# Patient Record
Sex: MALE | Race: WHITE | HISPANIC OR LATINO | Employment: FULL TIME | URBAN - METROPOLITAN AREA
[De-identification: names, ages, dates, MRNs, and addresses within clinical notes are randomized per-mention and may not be internally consistent; named-entity substitution may affect disease eponyms.]

---

## 2018-02-27 ENCOUNTER — HOSPITAL ENCOUNTER (OUTPATIENT)
Facility: MEDICAL CENTER | Age: 38
End: 2018-02-27
Attending: PREVENTIVE MEDICINE
Payer: COMMERCIAL

## 2018-02-27 ENCOUNTER — NON-PROVIDER VISIT (OUTPATIENT)
Dept: OCCUPATIONAL MEDICINE | Facility: CLINIC | Age: 38
End: 2018-02-27

## 2018-02-27 ENCOUNTER — OFFICE VISIT (OUTPATIENT)
Dept: OCCUPATIONAL MEDICINE | Facility: CLINIC | Age: 38
End: 2018-02-27

## 2018-02-27 DIAGNOSIS — Z02.89 ENCOUNTER FOR OCCUPATIONAL HEALTH EXAMINATION: ICD-10-CM

## 2018-02-27 PROCEDURE — 92553 AUDIOMETRY AIR & BONE: CPT | Performed by: PREVENTIVE MEDICINE

## 2018-02-27 PROCEDURE — 94375 RESPIRATORY FLOW VOLUME LOOP: CPT | Performed by: PREVENTIVE MEDICINE

## 2018-02-27 PROCEDURE — 8916 PR CLEARANCE ONLY: Performed by: PREVENTIVE MEDICINE

## 2018-02-28 NOTE — ADDENDUM NOTE
Addended by: BRISEIDA STUBBS on: 2/27/2018 05:15 PM     Modules accepted: Level of Service, SmartSet

## 2018-03-04 LAB
LEAD BLD-MCNC: 3 UG/DL (ref 0–4.9)
ZPP RBC-MCNC: 27 UG/DL (ref 0–40)
ZPP RBC-SRTO: 47 UMOLZPP/MOLHEM (ref 0–69)

## 2018-06-21 ENCOUNTER — OFFICE VISIT (OUTPATIENT)
Dept: MEDICAL GROUP | Facility: MEDICAL CENTER | Age: 38
End: 2018-06-21
Payer: COMMERCIAL

## 2018-06-21 VITALS
HEIGHT: 63 IN | SYSTOLIC BLOOD PRESSURE: 116 MMHG | WEIGHT: 167.11 LBS | DIASTOLIC BLOOD PRESSURE: 78 MMHG | TEMPERATURE: 97.5 F | BODY MASS INDEX: 29.61 KG/M2 | HEART RATE: 85 BPM | OXYGEN SATURATION: 94 %

## 2018-06-21 DIAGNOSIS — M54.50 ACUTE MIDLINE LOW BACK PAIN WITHOUT SCIATICA: ICD-10-CM

## 2018-06-21 DIAGNOSIS — Z00.00 PREVENTATIVE HEALTH CARE: ICD-10-CM

## 2018-06-21 PROCEDURE — 99204 OFFICE O/P NEW MOD 45 MIN: CPT | Performed by: PHYSICIAN ASSISTANT

## 2018-06-21 RX ORDER — INDOMETHACIN 50 MG/1
50 CAPSULE ORAL 3 TIMES DAILY
Qty: 30 CAP | Refills: 5 | Status: SHIPPED | OUTPATIENT
Start: 2018-06-21 | End: 2019-09-10

## 2018-06-21 RX ORDER — PREDNISONE 20 MG/1
20 TABLET ORAL DAILY
Qty: 12 TAB | Refills: 0 | Status: SHIPPED | OUTPATIENT
Start: 2018-06-21 | End: 2018-07-22 | Stop reason: SDUPTHER

## 2018-06-21 ASSESSMENT — PATIENT HEALTH QUESTIONNAIRE - PHQ9: CLINICAL INTERPRETATION OF PHQ2 SCORE: 0

## 2018-06-21 NOTE — PROGRESS NOTES
Chief Complaint   Patient presents with   • Establish Care     Lower back pain       Issa Ordaz is a 38 y.o. new male here today w his wife who helps w history and translating as needed for establishing care.      HPI:   Pt complain of new onset mid lower back pain that dose not radiate down the legs X 3 months. Pain sometimes radiates to the L side.  Pain comes and goes, when it gets bad it gets up to 10/10, otherwise 2/10. No trauma to the area, pt is a . Pain gets severe pain takes a medicine from [facility] that has indomethacin and dexamethasone 25-5 MG 2-3 times daily and states that medicine helps a lot with his pain. Patient is not sure what makes the pain better or worse other than the medicine.        Current medicines (including changes today)  Current Outpatient Prescriptions   Medication Sig Dispense Refill   • indomethacin (INDOCIN) 50 MG Cap Take 1 Cap by mouth 3 times a day. 30 Cap 5   • predniSONE (DELTASONE) 20 MG Tab Take 1 Tab by mouth every day. For 3-4 days until symptoms improve 12 Tab 0     No current facility-administered medications for this visit.      He  has no past medical history on file.  He  has no past surgical history on file.  Social History   Substance Use Topics   • Smoking status: Never Smoker   • Smokeless tobacco: Never Used   • Alcohol use Yes      Comment: Occ     Social History     Social History Narrative   • No narrative on file     Family History   Problem Relation Age of Onset   • No Known Problems Mother    • No Known Problems Father      Family Status   Relation Status   • Mother Alive   • Father Alive       Past medical, surgical, family, and social history is reviewed in Epic chart by me today.   Medications and allergies reviewed in Epic chart by me today.       ROS  General:  No fevers or chills, no night sweats or fatigue.   Eyes: no change in vision.   ENT:         Ears: No drainage. No change in hearing      Nose :No epitaxis        Mouth/ throat: No  "lesions. No sore throat  Resp: No difficulty breathing. No cough, wheezing.  CV: no SOB, no palpitation, no chest pain, no edema  GI: no nausea, no vomiting, no diarrhea or constipations. Bowel regular and normal. No melena or hematochezia. No hematemesis  : no Frequency, no urgency, no dysuria, no hematuria.   Skin: no rashes or abnormal lesions.   Neuro: No seizures, no tingling or numbness.   Endo: no polyuria, no polydypsia, no cold intolerance, no heat intolerance  Psych: no depression, no anxiety, no Drug/Alcohol abuse  Hem: no easy bruising.    As documented in history of present illness  ROS neg:  All other review of systems were reviewed and negative.             Objective:     Blood pressure 116/78, pulse 85, temperature 36.4 °C (97.5 °F), height 1.6 m (5' 3\"), weight 75.8 kg (167 lb 1.7 oz), SpO2 94 %. Body mass index is 29.6 kg/m².  Physical Exam:    Constitutional: Well-developed and well-nourished. No distress.   Skin: Skin is warm and dry. No rashes in visible areas.  Nail: w/o pitting, ridging or onychomycosis   Head: Atraumatic without lesions.  Eyes: Equal, round and reactive, conjunctiva clear,sclera non icteric, lids normal.  Ears:  External ears unremarkable. Tympanic membranes clear and intact.  Nose: Nares patent. Septum midline. Turbinates without erythema nor edema. No discharge.    Mouth/Throat: Dentition is good. Tongue normal. Oropharynx is clear and moist. Posterior pharynx without erythema or exudates.  Neck: Supple, trachea midline.  No thyromegaly present. No lymphadenopathy--cervical or supraclavicular  Cardiovascular: Regular rate and rhythm, without any murmurs.  No lower extremity edema.  Lung:  Clear to auscultation throughout w/o any wheeze or rhonchi. No adventitious sounds.    Abdomen: Soft, non tender, and without distention.  Musculoskeletal: decreased ROM of Lumbar, Unable to katherine to the L side, full extension and flexion and left right rotation   Neurological: speech " normal, mental status intact, gait grossly normal  Psychiatric:   Alert and oriented x3, normal affect and mood and behavior    Assessment and Plan:   The following treatment plan was discussed    1. Acute midline low back pain without sciatica  Pt declines PT at this point   He doesn't have insurance however he has a discount access to healthcare  - DX-LUMBAR SPINE-2 OR 3 VIEWS  - indomethacin (INDOCIN) 50 MG Cap; Take 1 Cap by mouth 3 times a day.  Dispense: 30 Cap; Refill: 5  - predniSONE (DELTASONE) 20 MG Tab; Take 1 Tab by mouth every day. For 3-4 days until symptoms improve  Dispense: 12 Tab; Refill: 0    2. Preventative health care    - COMP METABOLIC PANEL; Future  - LIPID PROFILE; Future  - CBC WITH DIFFERENTIAL; Future    Followup: Return in about 6 weeks (around 8/2/2018).  Labs and back pain         Please note that this dictation was created using voice recognition software. I have made every reasonable attempt to correct obvious errors, but I expect that there are errors of grammar and possibly content that I did not discover before finalizing the note

## 2018-07-22 DIAGNOSIS — M54.50 ACUTE MIDLINE LOW BACK PAIN WITHOUT SCIATICA: ICD-10-CM

## 2018-07-24 RX ORDER — PREDNISONE 20 MG/1
TABLET ORAL
Qty: 12 TAB | Refills: 0 | Status: SHIPPED | OUTPATIENT
Start: 2018-07-24 | End: 2019-09-10

## 2019-06-20 ENCOUNTER — APPOINTMENT (OUTPATIENT)
Dept: RADIOLOGY | Facility: IMAGING CENTER | Age: 39
End: 2019-06-20
Attending: PHYSICIAN ASSISTANT
Payer: COMMERCIAL

## 2019-06-20 ENCOUNTER — OFFICE VISIT (OUTPATIENT)
Dept: URGENT CARE | Facility: CLINIC | Age: 39
End: 2019-06-20
Payer: COMMERCIAL

## 2019-06-20 VITALS
RESPIRATION RATE: 16 BRPM | TEMPERATURE: 99.5 F | HEART RATE: 79 BPM | WEIGHT: 164 LBS | SYSTOLIC BLOOD PRESSURE: 124 MMHG | OXYGEN SATURATION: 95 % | DIASTOLIC BLOOD PRESSURE: 82 MMHG | BODY MASS INDEX: 27.29 KG/M2

## 2019-06-20 DIAGNOSIS — M79.652 PAIN OF LEFT THIGH: ICD-10-CM

## 2019-06-20 DIAGNOSIS — S76.319A HAMSTRING MUSCLE STRAIN, UNSPECIFIED LATERALITY, INITIAL ENCOUNTER: ICD-10-CM

## 2019-06-20 PROCEDURE — 99203 OFFICE O/P NEW LOW 30 MIN: CPT | Performed by: PHYSICIAN ASSISTANT

## 2019-06-20 PROCEDURE — 72170 X-RAY EXAM OF PELVIS: CPT | Mod: TC | Performed by: PHYSICIAN ASSISTANT

## 2019-06-20 RX ORDER — MELOXICAM 7.5 MG/1
7.5 TABLET ORAL DAILY
Qty: 10 TAB | Refills: 0 | Status: SHIPPED | OUTPATIENT
Start: 2019-06-20 | End: 2019-06-26

## 2019-06-21 ASSESSMENT — ENCOUNTER SYMPTOMS
SENSORY CHANGE: 0
SORE THROAT: 0
NECK PAIN: 0
LEG PAIN: 1
COUGH: 0
MYALGIAS: 0
TINGLING: 0
BACK PAIN: 0
FEVER: 0
FATIGUE: 0
FALLS: 0

## 2019-06-21 NOTE — PROGRESS NOTES
Subjective:      Issa Mario is a 39 y.o. male who presents with Leg Pain (bilateral thigh cramping withing the back , patient says he cant walk for long periods of time or make much movement for long periods of time x 3 weeks )            Patient is a 39-year-old male presents to urgent care with concern regarding bilateral posterior thigh pain much worse on the left for the last few weeks.  Patient reports that he is a  along with working on [employer] and reports that today while at work he was unable to stand for longer than 2 hours secondary to the pain.  He denies specific injury, trauma.  He reports that the pain is only localized to the posterior thigh right below his buttock with standing and walking.  He denies any fall, injury.  He denies any back pain, radiation down the leg or numbness and tingling.  He reports he has full range of motion in the hip and the thigh however once he puts pressure on the foot to stand he has notable pain into the back of the leg.  He denies prior history of same.      Leg Pain   This is a new problem. Episode onset: 3 weeks ago. The problem occurs intermittently. The problem has been waxing and waning. Pertinent negatives include no congestion, coughing, fatigue, fever, myalgias, neck pain, rash or sore throat. Exacerbated by: Weightbearing and walking. He has tried NSAIDs for the symptoms.       Review of Systems   Constitutional: Negative for fatigue and fever.   HENT: Negative for congestion and sore throat.    Respiratory: Negative for cough.    Musculoskeletal: Negative for back pain, falls, joint pain, myalgias and neck pain.        Bilateral posterior thigh pain worse on the left   Skin: Negative for rash.   Neurological: Negative for tingling and sensory change.   All other systems reviewed and are negative.         Objective:     /82 (BP Location: Left arm, Patient Position: Sitting, BP Cuff Size: Adult)   Pulse 79   Temp  37.5 °C (99.5 °F) (Temporal)   Resp 16   Wt 74.4 kg (164 lb)   SpO2 95%   BMI 27.29 kg/m²    PMH:  has no past medical history of Arthritis; Clotting disorder (HCC); Heart attack (HCC); Hyperlipidemia; or Hypertension.  MEDS:   Current Outpatient Prescriptions:   •  meloxicam (MOBIC) 7.5 MG Tab, Take 1 Tab by mouth every day for 10 days., Disp: 10 Tab, Rfl: 0  •  hydrocodone-acetaminophen (VICODIN) 5-500 MG TABS, Take 1 Tab by mouth every four hours as needed. (Patient not taking: Reported on 6/20/2019), Disp: 15 Each, Rfl: 0  •  naproxen (NAPROSYN) 500 MG TABS, Take 1 Tab by mouth 2 times a day, with meals. (Patient not taking: Reported on 6/20/2019), Disp: 14 Each, Rfl: 2  •  hydrocodone-acetaminophen (VICODIN ES) 7.5-750 MG per tablet, Take 1 Tab by mouth every four hours as needed (Pain). (Patient not taking: Reported on 6/20/2019), Disp: 25 Tab, Rfl: 0  •  ibuprofen (MOTRIN) 800 MG TABS, Take 1 Tab by mouth every 8 hours as needed. (Patient not taking: Reported on 6/20/2019), Disp: 30 Tab, Rfl: 3  ALLERGIES: No Known Allergies  SURGHX: No past surgical history on file.  SOCHX:  reports that he has quit smoking. He has never used smokeless tobacco. He reports that he drinks alcohol. He reports that he does not use drugs.  FH: Family history was reviewed, no pertinent findings to report    Physical Exam   Constitutional: He is oriented to person, place, and time. He appears well-developed and well-nourished. No distress.   HENT:   Head: Normocephalic and atraumatic.   Eyes: Pupils are equal, round, and reactive to light. Conjunctivae and EOM are normal.   Neck: Normal range of motion. Neck supple. No tracheal deviation present.   Cardiovascular: Normal rate and regular rhythm.    No murmur heard.  Pulmonary/Chest: Effort normal and breath sounds normal. No respiratory distress.   Musculoskeletal: Normal range of motion. He exhibits no edema.        Left hip: He exhibits normal range of motion, normal strength  and no tenderness.        Lumbar back: He exhibits normal range of motion, no tenderness, no bony tenderness, no swelling, no edema and no deformity.        Legs:  Localized to left sided tenderness to the hamstring insertion with resistance while laying.    Without surrounding erythema or further skin changes.  Without edema.  Without other bony tenderness.   Neurological: He is alert and oriented to person, place, and time. Coordination normal.   Skin: Skin is warm. No rash noted.   Psychiatric: He has a normal mood and affect. His behavior is normal. Judgment and thought content normal.   Vitals reviewed.              Assessment/Plan:     1. Pain of left thigh  - DX-PELVIS-1 OR 2 VIEWS; Future  - meloxicam (MOBIC) 7.5 MG Tab; Take 1 Tab by mouth every day for 10 days.  Dispense: 10 Tab; Refill: 0    2. Hamstring muscle strain, unspecified laterality, initial encounter  - meloxicam (MOBIC) 7.5 MG Tab; Take 1 Tab by mouth every day for 10 days.  Dispense: 10 Tab; Refill: 0    Limited evaluation of the sacrum and bilateral iliac crests due to overlying bowel content.  No displaced fracture or dislocation.  Mild osteoarthritis of bilateral hip joints.  Unremarkable bilateral SI joints.  Unremarkable pubic symphysis.    Left thigh was wrapped to support the left hamstring.  I will make referral to sports medicine for further evaluation and management.  Patient without any tenderness on examination other than localization to the left hamstring and minimally to the right.  Will write for the above and encouraged ice and heat rotation.  Patient is to follow-up with sports medicine ASAP.  He is also to avoid other concomitant use of NSAIDs as well.  Patient given precautionary s/sx that mandate immediate follow up and evaluation in the ED. Advised of risks of not doing so.    DDX, Supportive care, and indications for immediate follow-up discussed with patient.    Instructed to return to clinic or nearest emergency  department if we are not available for any change in condition, further concerns, or worsening of symptoms.    The patient demonstrated a good understanding and agreed with the treatment plan.  Please note that this dictation was created using voice recognition software. I have made every reasonable attempt to correct obvious errors, but I expect that there are errors of grammar and possibly content that I did not discover before finalizing the note.

## 2019-06-24 ENCOUNTER — TELEPHONE (OUTPATIENT)
Dept: URGENT CARE | Facility: CLINIC | Age: 39
End: 2019-06-24

## 2019-06-24 NOTE — TELEPHONE ENCOUNTER
Patient's wife, Lorraine, called stating that he is not feeling better and that he is in a lot of pain, they are wondering if they can get a different medication.   [phone] is his wife's number.

## 2019-06-26 ENCOUNTER — OFFICE VISIT (OUTPATIENT)
Dept: MEDICAL GROUP | Facility: CLINIC | Age: 39
End: 2019-06-26
Payer: COMMERCIAL

## 2019-06-26 ENCOUNTER — NON-PROVIDER VISIT (OUTPATIENT)
Dept: URGENT CARE | Facility: CLINIC | Age: 39
End: 2019-06-26

## 2019-06-26 VITALS
HEART RATE: 72 BPM | SYSTOLIC BLOOD PRESSURE: 118 MMHG | OXYGEN SATURATION: 95 % | HEIGHT: 64 IN | DIASTOLIC BLOOD PRESSURE: 84 MMHG | RESPIRATION RATE: 14 BRPM | WEIGHT: 164 LBS | BODY MASS INDEX: 28 KG/M2 | TEMPERATURE: 98.7 F

## 2019-06-26 DIAGNOSIS — Z11.1 ENCOUNTER FOR PPD TEST: ICD-10-CM

## 2019-06-26 DIAGNOSIS — S76.012A MUSCLE STRAIN OF LEFT GLUTEAL REGION, INITIAL ENCOUNTER: ICD-10-CM

## 2019-06-26 PROCEDURE — 86580 TB INTRADERMAL TEST: CPT | Performed by: PHYSICIAN ASSISTANT

## 2019-06-26 PROCEDURE — 99203 OFFICE O/P NEW LOW 30 MIN: CPT | Performed by: FAMILY MEDICINE

## 2019-06-26 RX ORDER — KETOROLAC TROMETHAMINE 30 MG/ML
30 INJECTION, SOLUTION INTRAMUSCULAR; INTRAVENOUS ONCE
Status: COMPLETED | OUTPATIENT
Start: 2019-06-26 | End: 2019-06-26

## 2019-06-26 RX ORDER — DICLOFENAC SODIUM 75 MG/1
75 TABLET, DELAYED RELEASE ORAL 2 TIMES DAILY
Qty: 30 TAB | Refills: 0 | Status: SHIPPED | OUTPATIENT
Start: 2019-06-26 | End: 2019-09-10

## 2019-06-26 RX ADMIN — KETOROLAC TROMETHAMINE 30 MG: 30 INJECTION, SOLUTION INTRAMUSCULAR; INTRAVENOUS at 18:25

## 2019-06-27 ASSESSMENT — ENCOUNTER SYMPTOMS
FEVER: 0
VOMITING: 0
SHORTNESS OF BREATH: 0

## 2019-06-27 NOTE — PROGRESS NOTES
Subjective:     Issa Mario is a 39 y.o. male who presents for Leg Pain (Onset 4 weeks, left hamstring, glute area on left side and bilateral low back pain.)    HPI  Pt presents for evaluation of left thigh pain   Pain is more in the posterior thigh in the hamstring area   Pain is only when he walks   Patient works a very physical job, however does not recall an injury or fall  Pain started slowly and has been worsening  Started more in his low back and is now more in the buttock area and posterior thigh  Pain does not wake him up at night  Has no weakness  No radiating pain into feet  No associated numbness  Was seen in urgent care and had normal pelvic x-rays  No changes in bowel or bladder function, no saddle anesthesia, no other red flag symptoms    Review of Systems   Constitutional: Negative for fever.   Respiratory: Negative for shortness of breath.    Cardiovascular: Negative for chest pain.   Gastrointestinal: Negative for vomiting.   Skin: Negative for rash.     PMH:  has no past medical history of Arthritis; Clotting disorder (HCC); Heart attack (HCC); Hyperlipidemia; or Hypertension.  MEDS:   Current Outpatient Prescriptions:   •  meloxicam (MOBIC) 7.5 MG Tab, Take 1 Tab by mouth every day for 10 days. (Patient not taking: Reported on 6/26/2019), Disp: 10 Tab, Rfl: 0  •  predniSONE (DELTASONE) 20 MG Tab, TAKE ONE TABLET BY MOUTH ONE TIME DAILY for 3 to 4 days until symptoms improve  (Patient not taking: Reported on 6/26/2019), Disp: 12 Tab, Rfl: 0  •  indomethacin (INDOCIN) 50 MG Cap, Take 1 Cap by mouth 3 times a day. (Patient not taking: Reported on 6/26/2019), Disp: 30 Cap, Rfl: 5  •  hydrocodone-acetaminophen (VICODIN) 5-500 MG TABS, Take 1 Tab by mouth every four hours as needed. (Patient not taking: Reported on 6/20/2019), Disp: 15 Each, Rfl: 0  •  naproxen (NAPROSYN) 500 MG TABS, Take 1 Tab by mouth 2 times a day, with meals. (Patient not taking: Reported on 6/20/2019), Disp: 14 Each,  "Rfl: 2  •  hydrocodone-acetaminophen (VICODIN ES) 7.5-750 MG per tablet, Take 1 Tab by mouth every four hours as needed (Pain). (Patient not taking: Reported on 6/20/2019), Disp: 25 Tab, Rfl: 0  •  ibuprofen (MOTRIN) 800 MG TABS, Take 1 Tab by mouth every 8 hours as needed. (Patient not taking: Reported on 6/20/2019), Disp: 30 Tab, Rfl: 3  ALLERGIES: No Known Allergies  SURGHX: History reviewed. No pertinent surgical history.  SOCHX:  reports that he has quit smoking. He has never used smokeless tobacco. He reports that he drinks alcohol. He reports that he does not use drugs.  FH: Family history was reviewed, not contributing to acute pain     Objective:   /84 (BP Location: Left arm, Patient Position: Sitting, BP Cuff Size: Adult)   Pulse 72   Temp 37.1 °C (98.7 °F) (Temporal)   Resp 14   Ht 1.626 m (5' 4\")   Wt 74.4 kg (164 lb)   SpO2 95%   BMI 28.15 kg/m²     Physical Exam   Constitutional: He is oriented to person, place, and time. He appears well-developed and well-nourished. No distress.   HENT:   Head: Normocephalic and atraumatic.   Musculoskeletal:   Back/legs:  General: No asymmetry, bruising, or erythema appreciated  ROM: lumbar flexion 90°, extension 30°, lateral bend 30°, lateral twist 30°.  Hip with FROM (+pain with hip flexion)  Palpation: +TTP along buttock area and into upper hamstring.  No tenderness to palpation of spinous processes, no step-offs appreciated, no tenderness to palpation of paraspinal muscles, no significant scoliosis appreciated  Strength: 5/5 hip flexion/extension  Special tests: Straight leg raise -   Neuro: Sensation intact and equal bilaterally in LE's     Neurological: He is alert and oriented to person, place, and time.   Skin: Skin is warm and dry. No rash noted. He is not diaphoretic.   Psychiatric: He has a normal mood and affect. His behavior is normal. Judgment and thought content normal.     Assessment/Plan:   Assessment    1. Muscle strain of left gluteal " region, initial encounter  Patient is a 39-year-old male with muscle strain and gluteal regions bilaterally and worse in the left side.  He has pain with hip flexion which is equal with knee bent or knee straight.  Do not believe this is related to hamstrings.  Suspect gluten med tendinopathy.  Patient works a very physical job and likely exacerbated through overuse.  Will start on anti-inflammatories, and get patient into physical therapy.  If he is not having rapid improvements, will have low threshold to pursue further imaging.  Follow-up after 2 to 3 weeks of physical therapy, or sooner if pain is worsening.  - REFERRAL TO PHYSICAL THERAPY Reason for Therapy: Eval/Treat/Report  - diclofenac EC (VOLTAREN) 75 MG Tablet Delayed Response; Take 1 Tab by mouth 2 times a day.  Dispense: 30 Tab; Refill: 0  - ketorolac (TORADOL) injection 30 mg; 1 mL by Intramuscular route Once.

## 2019-06-28 ENCOUNTER — APPOINTMENT (OUTPATIENT)
Dept: RADIOLOGY | Facility: IMAGING CENTER | Age: 39
End: 2019-06-28
Attending: NURSE PRACTITIONER
Payer: COMMERCIAL

## 2019-06-28 ENCOUNTER — NON-PROVIDER VISIT (OUTPATIENT)
Dept: URGENT CARE | Facility: CLINIC | Age: 39
End: 2019-06-28

## 2019-06-28 DIAGNOSIS — R76.11 POSITIVE TB TEST: ICD-10-CM

## 2019-06-28 LAB — TB WHEAL 3D P 5 TU DIAM: NORMAL MM

## 2019-06-28 PROCEDURE — 71045 X-RAY EXAM CHEST 1 VIEW: CPT | Mod: TC | Performed by: NURSE PRACTITIONER

## 2019-07-02 ENCOUNTER — OFFICE VISIT (OUTPATIENT)
Dept: MEDICAL GROUP | Facility: MEDICAL CENTER | Age: 39
End: 2019-07-02
Payer: COMMERCIAL

## 2019-07-02 VITALS
RESPIRATION RATE: 20 BRPM | TEMPERATURE: 97.2 F | SYSTOLIC BLOOD PRESSURE: 110 MMHG | BODY MASS INDEX: 27.55 KG/M2 | HEART RATE: 74 BPM | DIASTOLIC BLOOD PRESSURE: 76 MMHG | HEIGHT: 64 IN | WEIGHT: 161.38 LBS | OXYGEN SATURATION: 97 %

## 2019-07-02 DIAGNOSIS — M54.42 ACUTE MIDLINE LOW BACK PAIN WITH BILATERAL SCIATICA: ICD-10-CM

## 2019-07-02 DIAGNOSIS — M54.41 ACUTE MIDLINE LOW BACK PAIN WITH BILATERAL SCIATICA: ICD-10-CM

## 2019-07-02 PROCEDURE — 99214 OFFICE O/P EST MOD 30 MIN: CPT | Performed by: PHYSICIAN ASSISTANT

## 2019-07-02 RX ORDER — GABAPENTIN 300 MG/1
300 CAPSULE ORAL 3 TIMES DAILY
Qty: 90 CAP | Refills: 0 | Status: SHIPPED | OUTPATIENT
Start: 2019-07-02 | End: 2019-09-10

## 2019-07-02 RX ORDER — TRAMADOL HYDROCHLORIDE 50 MG/1
50 TABLET ORAL EVERY 8 HOURS PRN
Qty: 21 TAB | Refills: 0 | Status: SHIPPED
Start: 2019-07-02 | End: 2019-07-09

## 2019-07-02 NOTE — PROGRESS NOTES
"Chief Complaint   Patient presents with   • Leg Pain     sciatica       HPI  Issa Mario is a 39 y.o. male here today for lower back and leg pain.  Patient has been seen in urgent care twice for the same problem without any improvement.  He has pain over lower mid back Sacrum that radiates to both gluteus, going down the posterior thigh, not pass the knee, L worse than R.  Pain is every day all day continuous pain 10 out of 10.  Wife states pain is started the day after he drove to [place] for 8 hrs.  couldn't walk up the stair.  Patient has tried overall diclofenac, ibuprofen, Tylenol without any help.  States the only thing that helps is dexamethasone that he has from [place].  No loss of bladder or bowel movement however he started to have weakness over left leg.  This has affected his walking and gait.  No numbness weakness over bilateral feet.    I saw patient about a year ago for similar pain.      Past medical, surgical, family, and social history is reviewed in Epic chart by me today.   Medications and allergies reviewed and updated in Epic chart by me today.     ROS:   As documented in history of present illness above    Exam:  /76 (Patient Position: Sitting)   Pulse 74   Temp 36.2 °C (97.2 °F) (Temporal)   Resp 20   Ht 1.626 m (5' 4\")   Wt 73.2 kg (161 lb 6 oz)   SpO2 97%   Constitutional: Alert, no distress, plus 3 vital signs  Skin:  Warm, dry, no rashes invisible areas  Eye: Equal, round and reactive, conjunctiva clear  MS; plantar and dorsiflexion intact, patient has wobbly gait, unable to walk on heels or walk on toes.  Muscle strength intact    Psych: Alert, pleasant, well-groomed, normal affect    A/P:      1. Acute midline low back pain with bilateral sciatica  Patient has done x-ray, patient has pain every day, 10 out of 10.  It has not affected his gait.  No cauda equina, unable to walk on heels or toes    Treating with tramadol for short-term until  MRI is done and seen by " specialist.  Advised patient not to mix tramadol with alcohol,  Advised to discontinue daily dexamethasone that he has been taking    - MR-LUMBAR SPINE-W/O; Future  - REFERRAL TO PHYSIATRY (PMR)  - gabapentin (NEURONTIN) 300 MG Cap; Take 1 Cap by mouth 3 times a day.  Dispense: 90 Cap; Refill: 0  - tramadol (ULTRAM) 50 MG Tab; Take 1 Tab by mouth every 8 hours as needed for up to 7 days.  Dispense: 21 Tab; Refill: 0  - Consent for Opiate Prescription  - Controlled Substance Treatment Agreement  - Diclofenac Sodium 1 % Gel; Apply 2 g to skin as directed 3 times a day as needed.  Dispense: 1 Tube; Refill: 1      F/u prn     We discussed red flags incuding worsening pain, loss of bladder or bowel control, numbness or footdrop or overall decline in health. Patient verbalize understanding and will either call our office or present to the emergency room.

## 2019-07-12 ENCOUNTER — HOSPITAL ENCOUNTER (OUTPATIENT)
Dept: RADIOLOGY | Facility: MEDICAL CENTER | Age: 39
End: 2019-07-12
Attending: PHYSICIAN ASSISTANT
Payer: COMMERCIAL

## 2019-07-12 DIAGNOSIS — M54.41 ACUTE MIDLINE LOW BACK PAIN WITH BILATERAL SCIATICA: ICD-10-CM

## 2019-07-12 DIAGNOSIS — M54.42 ACUTE MIDLINE LOW BACK PAIN WITH BILATERAL SCIATICA: ICD-10-CM

## 2019-07-12 PROCEDURE — 72148 MRI LUMBAR SPINE W/O DYE: CPT

## 2019-07-16 ENCOUNTER — TELEPHONE (OUTPATIENT)
Dept: MEDICAL GROUP | Facility: MEDICAL CENTER | Age: 39
End: 2019-07-16

## 2019-07-16 NOTE — TELEPHONE ENCOUNTER
Phone Number Called: [phone] (home)     Call outcome: left message for patient to call back regarding message below    Message:   ----- Message from Shena Jimenez P.A.-C. sent at 7/16/2019  4:01 PM PDT -----  Please inform patient that back MRI shows only minimal disc bulge at L4-L5.  His symptoms are not proportional to these findings.  He has moderate disc space narrowing at T10-11 and T 11-12  Which is mid back and shouldn't cause symptoms in lower back and sacrum area which is the location he had pain.   I recommend following up with specialist.    Please let me know if you have any questions.    Shena Jimenez P.A.-C.

## 2019-09-10 ENCOUNTER — OCCUPATIONAL MEDICINE (OUTPATIENT)
Dept: URGENT CARE | Facility: CLINIC | Age: 39
End: 2019-09-10
Payer: COMMERCIAL

## 2019-09-10 VITALS
WEIGHT: 163 LBS | TEMPERATURE: 96.9 F | BODY MASS INDEX: 28.88 KG/M2 | HEART RATE: 71 BPM | DIASTOLIC BLOOD PRESSURE: 62 MMHG | RESPIRATION RATE: 14 BRPM | SYSTOLIC BLOOD PRESSURE: 104 MMHG | OXYGEN SATURATION: 94 % | HEIGHT: 63 IN

## 2019-09-10 DIAGNOSIS — Z02.1 PRE-EMPLOYMENT DRUG SCREENING: ICD-10-CM

## 2019-09-10 DIAGNOSIS — S05.01XA ABRASION OF RIGHT CORNEA, INITIAL ENCOUNTER: Primary | ICD-10-CM
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
## 2019-09-10 LAB
AMP AMPHETAMINE: NORMAL
COC COCAINE: NORMAL
INT CON NEG: NORMAL
INT CON POS: NORMAL
MET METHAMPHETAMINES: NORMAL
OPI OPIATES: NORMAL
PCP PHENCYCLIDINE: NORMAL
POC DRUG COMMENT 753798-OCCUPATIONAL HEALTH: NEGATIVE
THC: NORMAL

## 2019-09-10 PROCEDURE — 80305 DRUG TEST PRSMV DIR OPT OBS: CPT | Mod: 29 | Performed by: PHYSICIAN ASSISTANT

## 2019-09-10 PROCEDURE — 99214 OFFICE O/P EST MOD 30 MIN: CPT | Mod: 29 | Performed by: PHYSICIAN ASSISTANT

## 2019-09-10 RX ORDER — ERYTHROMYCIN 5 MG/G
1 OINTMENT OPHTHALMIC 3 TIMES DAILY
Qty: 1 TUBE | Refills: 0 | Status: SHIPPED | OUTPATIENT
Start: 2019-09-10 | End: 2019-09-15

## 2019-09-10 ASSESSMENT — ENCOUNTER SYMPTOMS
FEVER: 0
PHOTOPHOBIA: 0
DIARRHEA: 0
SHORTNESS OF BREATH: 0
BLURRED VISION: 0
VOMITING: 0
ABDOMINAL PAIN: 0
EYE PAIN: 1
NAUSEA: 0
DOUBLE VISION: 0
EYE REDNESS: 1
COUGH: 0
EYE DISCHARGE: 0
CHILLS: 0
CONSTIPATION: 0

## 2019-09-10 NOTE — PATIENT INSTRUCTIONS
Abrasión corneal  (Corneal Abrasion)  La córnea es la cubierta transparente en la parte anterior y central del parish. Cuando kurtis la parte colorida del parish, está mirando a través de la córnea. Es un tejido martinez formado por capas. La capa superior es la capa más sensible. La abrasión corneal ocurre cuando esta capa sufre un rasguño o german lesión hace que se desprenda.  CUIDADOS EN EL HOGAR  · Pueden administrarle gotas o german crema con medicamento. Muse los medicamentos sarah le indique berger médico.  · Es posible que le apliquen un parche de presión sobre el parish. Si rodrigo es el naomy, siga las instrucciones de berger médico respecto de cuándo retirar el parche. No conduzca ni opere maquinaria mientras lleve puesto el parche. Es difícil juzgar las distancias en estas condiciones.  · Visite a berger médico para que le realice un examen de seguimiento, si así se lo indican. Es muy importante que cumpla con esta sean.  SOLICITE AYUDA SI:  · Siente dolor, tiene sensibilidad a la edison y tiene german sensación de picazón en un parish o en ambos.  · Berger parche de presión se afloja continuamente. Puede parpadear debajo del parche.  · Supura líquido del parish o los párpados se pegan por la mañana.  · Siente por la mañana los mismos síntomas que sintió con la primera abrasión. [place] podría ocurrir días, semanas o meses después de que se curó de la primera abrasión.  Esta información no tiene sarah fin reemplazar el consejo del médico. Asegúrese de hacerle al médico cualquier pregunta que tenga.  Document Released: 01/20/2012 Document Revised: 09/07/2016 Document Reviewed: 08/25/2014  Elsevier Interactive Patient Education © 2017 [employer]

## 2019-09-10 NOTE — PROGRESS NOTES
"Subjective:   Issa Mario is a 39 y.o. male who presents for Eye Problem (Pt states when he was working in an area that had a pine tree he moved it with his arm and the branch slipped off and came back hitting his RT eye x today. Pain in the eye when there is wind)    DOI 9/10/19: the pt was moving a pine tree when it swung backwards hitting him in the R eye. Pain has been constant since incident. The pt attempted to flush eye immediately. He is experiencing a burning sensation. No discharge or visual changes. No previous injury. Denies second job.      HPI  Review of Systems   Constitutional: Negative for chills, fever and malaise/fatigue.   Eyes: Positive for pain and redness. Negative for blurred vision, double vision, photophobia and discharge.   Respiratory: Negative for cough and shortness of breath.    Gastrointestinal: Negative for abdominal pain, constipation, diarrhea, nausea and vomiting.   All other systems reviewed and are negative.      PMH:  has no past medical history of Arthritis, Clotting disorder (HCC), Heart attack (HCC), Hyperlipidemia, or Hypertension.  MEDS:   Current Outpatient Medications:   •  erythromycin 5 MG/GM Ointment, Place 1 Application in right eye 3 times a day for 5 days., Disp: 1 Tube, Rfl: 0  •  Diclofenac Sodium 1 % Gel, Apply 2 g to skin as directed 3 times a day as needed. (Patient not taking: Reported on 9/10/2019), Disp: 1 Tube, Rfl: 1  ALLERGIES: No Known Allergies  SURGHX: History reviewed. No pertinent surgical history.  SOCHX:  reports that he has quit smoking. He has never used smokeless tobacco. He reports that he drinks alcohol. He reports that he does not use drugs.  Family History   Problem Relation Age of Onset   • Cancer Neg Hx    • Lung Disease Neg Hx    • Heart Disease Neg Hx    • No Known Problems Mother    • No Known Problems Father         Objective:   /62   Pulse 71   Temp 36.1 °C (96.9 °F)   Resp 14   Ht 1.6 m (5' 3\")   Wt 73.9 kg " (163 lb)   SpO2 94%   BMI 28.87 kg/m²     Physical Exam   Constitutional: He is oriented to person, place, and time. He appears well-developed and well-nourished. No distress.   HENT:   Head: Normocephalic and atraumatic.   Nose: Nose normal.   Eyes: Pupils are equal, round, and reactive to light. Conjunctivae and EOM are normal. Lids are everted and swept, no foreign bodies found. No foreign body present in the right eye.   Slit lamp exam:       The right eye shows fluorescein uptake (small area of corneal uptake near the R lateral iris ).       Neck: Normal range of motion. Neck supple. No tracheal deviation present.   Cardiovascular: Normal rate and regular rhythm.   Pulmonary/Chest: Effort normal and breath sounds normal.   Neurological: He is alert and oriented to person, place, and time.   Skin: Skin is warm and dry. Capillary refill takes less than 2 seconds.   Psychiatric: He has a normal mood and affect. His behavior is normal.   Vitals reviewed.      Assessment/Plan:     1. Abrasion of right cornea, initial encounter  erythromycin 5 MG/GM Ointment   2. Work related injury  POCT 6 Panel Urine Drug Screen   3. Pre-employment drug screening       Procedure note: Fluorescein Stain     Local anesthesia was achieved using proparacaine topical eye drops. The eye was copiously irrigated with saline. The pt eye was stained with fluorescein stain. There was up take of the cornea on the right lateral border of the iris.     Anticipatory guidance, as well as standard post-procedure care, was explained. Return precautions are given. The patient tolerated the procedure well without complications.    Follow-up in urgent care in 2 days.  Corneal abrasion handout provided.    If symptoms worsen or persist patient can return to clinic for reevaluation.  Red flags and emergency room precautions discussed. Patient confirmed understanding of information.    Please note that this dictation was created using voice recognition  software. I have made every reasonable attempt to correct obvious errors, but I expect that there are errors of grammar and possibly content that I did not discover before finalizing the note.

## 2019-09-10 NOTE — LETTER
[facility] [place] [address] [address]  Phone:  [phone] - Fax:  [phone]   [facility] Progress Report and Disability Certification  Date of Service: 9/10/2019   No Show:  No  Date / Time of Next Visit: 9/12/2019 @ 5:40 PM   Claim Information   Patient Name: Issa Mario  Claim Number:     Employer: [employer]  Date of Injury: 9/10/2019     Insurer / TPA: [employer]  ID / SSN:     Occupation:   Diagnosis: The primary encounter diagnosis was Abrasion of right cornea, initial encounter. Diagnoses of Work related injury and Pre-employment drug screening were also pertinent to this visit.    Medical Information   Related to Industrial Injury? Yes    Subjective Complaints:  DOI 9/10/19: the pt was moving a pine tree when it swung backwards hitting him in the R eye. Pain has been constant since incident. The pt attempted to flush eye immediately. He is experiencing a burning sensation. No discharge or visual changes. No previous injury. Denies second job.      Objective Findings: Physical Exam   Constitutional: He is oriented to person, place, and time. He appears well-developed and well-nourished. No distress.   HENT:   Head: Normocephalic and atraumatic.   Nose: Nose normal.   Eyes: Pupils are equal, round, and reactive to light. Conjunctivae and EOM are normal. Lids are everted and swept, no foreign bodies found. No foreign body present in the right eye.   Slit lamp exam:       The right eye shows fluorescein uptake (small area of corneal uptake near the R lateral iris ).       Neck: Normal range of motion. Neck supple. No tracheal deviation present.   Cardiovascular: Normal rate and regular rhythm.   Pulmonary/Chest: Effort normal and breath sounds normal.   Neurological: He is alert and oriented to person, place, and time.   Skin: Skin is warm and dry. Capillary refill takes less than 2 seconds.   Psychiatric: He has a  normal mood and affect. His behavior is normal.   Vitals reviewed.     Pre-Existing Condition(s):     Assessment:   Initial Visit    Status: Additional Care Required  Permanent Disability:No    Plan: Medication  Comments:erythromycin OP TID x 5 days    Diagnostics: Other (see comment)  Comments:Fluoroscein stain    Comments:       Disability Information   Status: Released to Restricted Duty    From:  9/10/2019  Through: 9/12/2019 Restrictions are: Temporary   Physical Restrictions   Sitting:    Standing:    Stooping:    Bending:      Squatting:    Walking:    Climbing:    Pushing:      Pulling:    Other:    Reaching Above Shoulder (L):   Reaching Above Shoulder (R):       Reaching Below Shoulder (L):    Reaching Below Shoulder (R):      Not to exceed Weight Limits   Carrying(hrs):   Weight Limit(lb):   Lifting(hrs):   Weight  Limit(lb):     Comments: Pt directed to wear eye protection. Please allow for breaks as needed. F/u in 2 days.     Repetitive Actions   Hands: i.e. Fine Manipulations from Grasping:     Feet: i.e. Operating Foot Controls:     Driving / Operate Machinery:     Physician Name: Pamela Fernandez P.A.-C. Physician Signature: PAMELA Caldwell P.A.-C. e-Signature: Dr. Nilesh Morejon, Medical Director   Clinic Name / Location: [address] [place] [address] Clinic Phone Number: Dept: [phone]   Appointment Time: 12:45 Pm Visit Start Time: 1:48 PM   Check-In Time:  12:49 Pm Visit Discharge Time: 2:32 PM   Original-Treating Physician or Chiropractor    Page 2-Insurer/TPA    Page 3-Employer    Page 4-Employee

## 2019-09-10 NOTE — LETTER
"EMPLOYEE’S CLAIM FOR COMPENSATION/ REPORT OF INITIAL TREATMENT  FORM C-4    EMPLOYEE’S CLAIM - PROVIDE ALL INFORMATION REQUESTED   First Name  Isas Last Name  Orlin Mario Birthdate                    1980                Sex  male Claim Number   Home Address  [address] Age  39 y.o. Height  1.6 m (5' 3\") Weight  73.9 kg (163 lb) [facility]     [place] Zip  [address] Telephone  [phone] (home)    Mailing Address  [address] [place] Zip  [address] Primary Language Spoken  English    Insurer  Unknown Third Party   [employer] Claims   Employee's Occupation (Job Title) When Injury or Occupational Disease Occurred  [place]    Employer's Name  [employer]  Telephone  [phone]    Employer Address  [address]  Zip  [address]    Date of Injury  9/10/2019               Hour of Injury  9:30 AM Date Employer Notified  9/10/2019 Last Day of Work after Injury or Occupational Disease  9/10/2019 Supervisor to Whom Injury Reported  Bandar Long   Address or Location of Accident (if applicable)  [[facility]]   What were you doing at the time of accident? (if applicable)  Working    How did this injury or occupational disease occur? (Be specific an answer in detail. Use additional sheet if necessary)  Spraying paint on a house, when I moved the pine tree the pine hit my eyes.   If you believe that you have an occupational disease, when did you first have knowledge of the disability and it relationship to your employment?  n/a Witnesses to the Accident  Patel      Nature of Injury or Occupational Disease  Defer  Part(s) of Body Injured or Affected  Eye (L), Eye (R), Defer    I certify that the above is true and correct to the best of my knowledge and that I have provided this information in order to obtain the benefits of " [place]).  I hereby authorize any physician, chiropractor, surgeon, practitioner, or other person, any hospital, including [facility] or [facility], any medical service organization, any insurance company, or other institution or organization to release to each other, any medical or other information, including benefits paid or payable, pertinent to this injury or disease, except information relative to diagnosis, treatment and/or counseling for AIDS, psychological conditions, alcohol or controlled substances, for which I must give specific authorization.  A Photostat of this authorization shall be as valid as the original.     Date   Place   Employee’s Signature   THIS REPORT MUST BE COMPLETED AND MAILED WITHIN 3 WORKING DAYS OF TREATMENT   Place  [facility]  Name of Facility  [facility]   Date  9/10/2019 Diagnosis  (S05.01XA) Abrasion of right cornea, initial encounter  (primary encounter diagnosis)  (Y99.0) Work related injury      Is there evidence the injured employee was under the influence of alcohol and/or another controlled substance at the time of accident?   Hour  1:48 PM Description of Injury or Disease  The primary encounter diagnosis was Abrasion of right cornea, initial encounter.    No   Treatment  Start erythromycin OP ointment.  Monitor area closely.  Return to clinic in 2 days for reevaluation.  Have you advised the patient to remain off work five days or more? No   X-Ray Findings      If Yes   From Date  To Date      From information given by the employee, together with medical evidence, can you directly connect this injury or occupational disease as job incurred?  Yes If No Full Duty  No Modified Duty  Yes   Is additional medical care by a physician indicated?  Yes If Modified Duty, Specify any Limitations / Restrictions  Please see D 39   Do you know of any  "previous injury or disease contributing to this condition or occupational disease?                            No   Date  9/10/2019 Print Doctor’s Name Pamela Fernandez P.A.-C. I certify the employer’s copy of  this form was mailed on:   Address  [address] Insurer’s Use Only     [place] Zip  [address]    Provider’s Tax ID Number  [id number] Telephone  Dept: [phone]        e-PAMELA Polo P.A.-C.   e-Signature: Dr. Nilesh Morejon, Medical Director Degree  MD        ORIGINAL-TREATING PHYSICIAN OR CHIROPRACTOR    PAGE 2-INSURER/TPA    PAGE 3-EMPLOYER    PAGE 4-EMPLOYEE             Form C-4 (rev.10/07)              BRIEF DESCRIPTION OF RIGHTS AND BENEFITS  (Pursuant to [place]RS 616C.050)    Notice of Injury or Occupational Disease (Incident Report Form C-1): If an injury or occupational disease (OD) arises out of and in the course of employment, you must provide written notice to your employer as soon as practicable, but no later than 7 days after the accident or OD. Your employer shall maintain a sufficient supply of the required forms.    Claim for Compensation (Form C-4): If medical treatment is sought, the form C-4 is available at the place of initial treatment. A completed \"Claim for Compensation\" (Form C-4) must be filed within 90 days after an accident or OD. The treating physician or chiropractor must, within 3 working days after treatment, complete and mail to the employer, the employer's insurer and third-party , the Claim for Compensation.    Medical Treatment: If you require medical treatment for your on-the-job injury or OD, you may be required to select a physician or chiropractor from a list provided by your workers’ compensation insurer, if it has contracted with an Organization for Managed Care (MCO) or Preferred Provider Organization (PPO) or providers of health care. If your employer has not entered into a contract with an MCO or PPO, you may select a " physician or chiropractor from the Panel of Physicians and Chiropractors. Any medical costs related to your industrial injury or OD will be paid by your insurer.    Temporary Total Disability (TTD): If your doctor has certified that you are unable to work for a period of at least 5 consecutive days, or 5 cumulative days in a 20-day period, or places restrictions on you that your employer does not accommodate, you may be entitled to TTD compensation.    Temporary Partial Disability (TPD): If the wage you receive upon reemployment is less than the compensation for TTD to which you are entitled, the insurer may be required to pay you TPD compensation to make up the difference. TPD can only be paid for a maximum of 24 months.    Permanent Partial Disability (PPD): When your medical condition is stable and there is an indication of a PPD as a result of your injury or OD, within 30 days, your insurer must arrange for an evaluation by a rating physician or chiropractor to determine the degree of your PPD. The amount of your PPD award depends on the date of injury, the results of the PPD evaluation and your age and wage.    Permanent Total Disability (PTD): If you are medically certified by a treating physician or chiropractor as permanently and totally disabled and have been granted a PTD status by your insurer, you are entitled to receive monthly benefits not to exceed 66 2/3% of your average monthly wage. The amount of your PTD payments is subject to reduction if you previously received a PPD award.    Vocational Rehabilitation Services: You may be eligible for vocational rehabilitation services if you are unable to return to the job due to a permanent physical impairment or permanent restrictions as a result of your injury or occupational disease.    Transportation and Per Prema Reimbursement: You may be eligible for travel expenses and per prema associated with medical treatment.    Reopening: You may be able to reopen  your claim if your condition worsens after claim closure.    Appeal Process: If you disagree with a written determination issued by the insurer or the insurer does not respond to your request, you may appeal to the Department of Administration, , by following the instructions contained in your determination letter. You must appeal the determination within 70 days from the date of the determination letter at [address], [address], [place], [place] [address], or [address], [place], [place] [address]. If you disagree with the  decision, you may appeal to the Department of Administration, . You must file your appeal within 30 days from the date of the  decision letter at [address], [address], [place], [place] [address], or [address], [place], [place] [address]. If you disagree with a decision of an , you may file a petition for judicial review with the District Court. You must do so within 30 days of the Appeal Officer’s decision. You may be represented by an  at your own expense or you may contact the [facility] for possible representation.    [place]  for Injured Workers (NAIW): If you disagree with a  decision, you may request that NAIW represent you without charge at an  Hearing. For information regarding denial of benefits, you may contact the [facility] at: [address], [address], [place] [address], [phone], or [address], [place] [address], [phone]    To File a Complaint with the Division: If you wish to file a complaint with the  of the Division of Industrial Relations (DIR),  please contact the Workers’ Compensation Section, [address], [place], [place] [address], telephone [phone]) [phone], or [address], [place], [place] [address], telephone  [phone].    For assistance with Workers’ Compensation Issues: you may contact the Office of the Governor Consumer Health Assistance, [address] [place] [address], [address], [place] [address], Toll Free 1-421.570.8753, Web site: http://[email or web address].[email or web address]., E-mail [email or web address].                             __________________________________________________________________                                                                   _________________                Employee Name / Signature                                                                                                                                                       Date                                                                                                                                                                                                     D-2 (rev. 06/18)

## 2019-09-12 ENCOUNTER — OCCUPATIONAL MEDICINE (OUTPATIENT)
Dept: URGENT CARE | Facility: CLINIC | Age: 39
End: 2019-09-12
Payer: COMMERCIAL

## 2019-09-12 VITALS
OXYGEN SATURATION: 95 % | HEART RATE: 81 BPM | DIASTOLIC BLOOD PRESSURE: 70 MMHG | TEMPERATURE: 98.3 F | WEIGHT: 163 LBS | BODY MASS INDEX: 28.88 KG/M2 | HEIGHT: 63 IN | SYSTOLIC BLOOD PRESSURE: 118 MMHG | RESPIRATION RATE: 16 BRPM

## 2019-09-12 DIAGNOSIS — S05.01XD ABRASION OF RIGHT CORNEA, SUBSEQUENT ENCOUNTER: ICD-10-CM

## 2019-09-12 PROCEDURE — 99213 OFFICE O/P EST LOW 20 MIN: CPT | Mod: 29 | Performed by: NURSE PRACTITIONER

## 2019-09-12 ASSESSMENT — ENCOUNTER SYMPTOMS
DOUBLE VISION: 0
EYE REDNESS: 0
EYE DISCHARGE: 0
HEADACHES: 0
BLURRED VISION: 0
EYE PAIN: 0

## 2019-09-12 NOTE — LETTER
[address] [place] [address]  Phone:  [phone] - Fax:  [phone]   [facility] Progress Report and Disability Certification  Date of Service: 9/12/2019   No Show:  No  Date / Time of Next Visit:     Claim Information   Patient Name: Issa Mario  Claim Number:     Employer: [employer]  Date of Injury: 9/10/2019     Insurer / TPA: [employer] Claims  ID / SSN:     Occupation:   Diagnosis: The encounter diagnosis was Abrasion of right cornea, subsequent encounter.    Medical Information   Related to Industrial Injury? Yes    Subjective Complaints:  DOI: 9/10/19. Patient is 39 year old male here for follow up on right corneal abrasion after having pine needle in it. Denies any pain, blurred or double vision. He denies any discharge. No prior history of injury to this eye and no second job.   Objective Findings: Physical Exam   Constitutional: He is oriented to person, place, and time. He appears well-developed and well-nourished. No distress.   Eyes: Pupils are equal, round, and reactive to light. Conjunctivae and EOM are normal. Right eye exhibits no discharge. Left eye exhibits no discharge.   Musculoskeletal: Normal range of motion.   Neurological: He is alert and oriented to person, place, and time.   Skin: Skin is warm and dry.   Psychiatric: He has a normal mood and affect. His behavior is normal. Thought content normal.      Pre-Existing Condition(s):     Assessment:   Condition Improved    Status: Discharged /  MMI  Permanent Disability:No    Plan:      Diagnostics:      Comments:       Disability Information   Status: Released to Full Duty    From:     Through:   Restrictions are:     Physical Restrictions   Sitting:    Standing:    Stooping:    Bending:      Squatting:    Walking:    Climbing:    Pushing:      Pulling:    Other:    Reaching Above Shoulder (L):   Reaching Above Shoulder (R):       Reaching Below  Shoulder (L):    Reaching Below Shoulder (R):      Not to exceed Weight Limits   Carrying(hrs):   Weight Limit(lb):   Lifting(hrs):   Weight  Limit(lb):     Comments:      Repetitive Actions   Hands: i.e. Fine Manipulations from Grasping:     Feet: i.e. Operating Foot Controls:     Driving / Operate Machinery:     Physician Name: NAJMA Cameron Physician Signature: JORDANA Sher e-Signature: Dr. Nilesh Morejon, Medical Director   Clinic Name / Location: [address] [address] Clinic Phone Number: Dept: [phone]   Appointment Time: 5:45 Pm Visit Start Time: 6:02 PM   Check-In Time:  5:49 Pm Visit Discharge Time: 6:18 PM   Original-Treating Physician or Chiropractor    Page 2-Insurer/TPA    Page 3-Employer    Page 4-Employee

## 2019-09-13 NOTE — PROGRESS NOTES
"Subjective:      Issa Mario is a 39 y.o. male who presents with Work-Related Injury (follow up on eye injury )            HPI DOI: 9/10/19. Patient is 39 year old male here for follow up on right corneal abrasion after having pine needle in it. Denies any pain, blurred or double vision. He denies any discharge. No prior history of injury to this eye and no second job.  Reviewed medications, allergies, social history and family history in epic today. Not relevant to today's visit.    Review of Systems   Eyes: Negative for blurred vision, double vision, pain, discharge and redness.   Neurological: Negative for headaches.          Objective:     /70   Pulse 81   Temp 36.8 °C (98.3 °F) (Temporal)   Resp 16   Ht 1.6 m (5' 3\")   Wt 73.9 kg (163 lb)   SpO2 95%   BMI 28.87 kg/m²      Physical Exam   Constitutional: He is oriented to person, place, and time. He appears well-developed and well-nourished. No distress.   Eyes: Pupils are equal, round, and reactive to light. Conjunctivae and EOM are normal. Right eye exhibits no discharge. Left eye exhibits no discharge.   Musculoskeletal: Normal range of motion.   Neurological: He is alert and oriented to person, place, and time.   Skin: Skin is warm and dry.   Psychiatric: He has a normal mood and affect. His behavior is normal. Thought content normal.               Assessment/Plan:     1. Abrasion of right cornea, subsequent encounter       MMI. Much improved and normal exam.  "

## 2023-08-03 ENCOUNTER — OFFICE VISIT (OUTPATIENT)
Dept: URGENT CARE | Facility: PHYSICIAN GROUP | Age: 43
End: 2023-08-03
Payer: COMMERCIAL

## 2023-08-03 VITALS
SYSTOLIC BLOOD PRESSURE: 122 MMHG | OXYGEN SATURATION: 95 % | DIASTOLIC BLOOD PRESSURE: 84 MMHG | HEART RATE: 112 BPM | TEMPERATURE: 98.8 F | RESPIRATION RATE: 14 BRPM | BODY MASS INDEX: 27.83 KG/M2 | WEIGHT: 163 LBS | HEIGHT: 64 IN

## 2023-08-03 DIAGNOSIS — R00.0 TACHYCARDIA: ICD-10-CM

## 2023-08-03 DIAGNOSIS — M79.10 MYALGIA: ICD-10-CM

## 2023-08-03 DIAGNOSIS — J34.89 RHINORRHEA: ICD-10-CM

## 2023-08-03 DIAGNOSIS — J02.0 STREP PHARYNGITIS: ICD-10-CM

## 2023-08-03 DIAGNOSIS — U07.1 COVID-19: ICD-10-CM

## 2023-08-03 DIAGNOSIS — J02.9 SORE THROAT: ICD-10-CM

## 2023-08-03 LAB
FLUAV RNA SPEC QL NAA+PROBE: NEGATIVE
FLUBV RNA SPEC QL NAA+PROBE: NEGATIVE
RSV RNA SPEC QL NAA+PROBE: NEGATIVE
S PYO DNA SPEC NAA+PROBE: DETECTED
SARS-COV-2 RNA RESP QL NAA+PROBE: POSITIVE

## 2023-08-03 PROCEDURE — 99204 OFFICE O/P NEW MOD 45 MIN: CPT | Performed by: PHYSICIAN ASSISTANT

## 2023-08-03 PROCEDURE — 3079F DIAST BP 80-89 MM HG: CPT | Performed by: PHYSICIAN ASSISTANT

## 2023-08-03 PROCEDURE — 87651 STREP A DNA AMP PROBE: CPT | Performed by: PHYSICIAN ASSISTANT

## 2023-08-03 PROCEDURE — 0241U POCT CEPHEID COV-2, FLU A/B, RSV - PCR: CPT | Performed by: PHYSICIAN ASSISTANT

## 2023-08-03 PROCEDURE — 3074F SYST BP LT 130 MM HG: CPT | Performed by: PHYSICIAN ASSISTANT

## 2023-08-03 RX ORDER — IBUPROFEN 200 MG
200 TABLET ORAL EVERY 6 HOURS PRN
COMMUNITY

## 2023-08-03 RX ORDER — AMOXICILLIN 500 MG/1
500 CAPSULE ORAL 2 TIMES DAILY
Qty: 20 CAPSULE | Refills: 0 | Status: SHIPPED | OUTPATIENT
Start: 2023-08-03 | End: 2023-08-13

## 2023-08-03 NOTE — LETTER
August 3, 2023    To Whom It May Concern:         This is confirmation that Issa Mario attended his scheduled appointment with Hattie Rodriguez P.A.-C. on 8/03/23.  Please excuse patient from work today.         If you have any questions please do not hesitate to call me at the phone number listed below.    Sincerely,          Hattie Rodriguez P.A.-C.  [phone]

## 2023-08-03 NOTE — PROGRESS NOTES
"Subjective:   Issa Mario is a 43 y.o. male who presents for URI (Red eyes, burning ,headache, body aches, sore throat ,runny nose, x 3 days )     2 days h/o fever, ST, chills, myalgias  Painful swallowing, some pain to anterior neck  No sick contacts  No significant cough or SOB  No underlying respiratory illnesses  Took ibuprofen last night        Medications:  Diclofenac Sodium Gel  ibuprofen Tabs    Allergies:             Patient has no known allergies.    Surgical History:       No past surgical history on file.    Past Social Hx:  Isas Mario  reports that he has quit smoking. He has never used smokeless tobacco. He reports current alcohol use. He reports that he does not use drugs.     Past Family Hx:   Issa Mario family history includes No Known Problems in his father and mother.       Problem list, medications, and allergies reviewed by myself today in Epic.     Objective:     /84   Pulse (!) 112   Temp 37.1 °C (98.8 °F) (Temporal)   Resp 14   Ht 1.626 m (5' 4\")   Wt 73.9 kg (163 lb)   SpO2 95%   BMI 27.98 kg/m²     Physical Exam  Vitals and nursing note reviewed.   Constitutional:       General: He is not in acute distress.     Appearance: Normal appearance. He is ill-appearing. He is not toxic-appearing or diaphoretic.   HENT:      Right Ear: Tympanic membrane and external ear normal.      Left Ear: Tympanic membrane and external ear normal.      Nose: Nose normal.      Mouth/Throat:      Lips: Pink.      Mouth: Mucous membranes are moist. No oral lesions or angioedema.      Palate: No lesions.      Pharynx: Uvula midline. Oropharyngeal exudate and posterior oropharyngeal erythema present. No uvula swelling.      Tonsils: Tonsillar exudate present. No tonsillar abscesses. 2+ on the right. 2+ on the left.      Comments: Tonsillar erythema with trace hypertrophy and mild tonsillar exudate  No evidence of peritonsillar abscess  Voice non-muffled  Uvula " midline  Normal phonation  Eyes:      Conjunctiva/sclera: Conjunctivae normal.   Cardiovascular:      Rate and Rhythm: Normal rate and regular rhythm.      Pulses: Normal pulses.      Heart sounds: Normal heart sounds.   Pulmonary:      Effort: Pulmonary effort is normal. No respiratory distress.      Breath sounds: Normal breath sounds. No stridor. No wheezing or rhonchi.   Abdominal:      Tenderness: There is no abdominal tenderness.   Musculoskeletal:      Cervical back: No rigidity.   Lymphadenopathy:      Cervical: Cervical adenopathy present.   Skin:     Findings: No rash.   Neurological:      Mental Status: He is alert.       Results for orders placed or performed in visit on 08/03/23   POCT GROUP A STREP, PCR   Result Value Ref Range    POC Group A Strep, PCR Detected (A) Not Detected, Invalid   POCT CoV-2, Flu A/B, RSV by PCR   Result Value Ref Range    SARS-CoV-2 by PCR Positive (A) Negative, Invalid    Influenza virus A RNA Negative Negative, Invalid    Influenza virus B, PCR Negative Negative, Invalid    RSV, PCR Negative Negative, Invalid         Assessment/Plan:     Diagnosis and Associated Orders:     1. Sore throat  - POCT GROUP A STREP, PCR  - POCT CoV-2, Flu A/B, RSV by PCR    2. Rhinorrhea  - POCT GROUP A STREP, PCR  - POCT CoV-2, Flu A/B, RSV by PCR    3. Myalgia  - POCT GROUP A STREP, PCR  - POCT CoV-2, Flu A/B, RSV by PCR    4. Strep pharyngitis  - amoxicillin (AMOXIL) 500 MG Cap; Take 1 Capsule by mouth 2 times a day for 10 days.  Dispense: 20 Capsule; Refill: 0    5. COVID-19    6. Tachycardia    Other orders  - ibuprofen (MOTRIN) 200 MG Tab; Take 200 mg by mouth every 6 hours as needed.        Comments/MDM:  Tachycardia manifest as systemic illness.  Strep and COVID positive.  Results communicated patient via [facility].  Amoxicillin prescription sent.  Patient will be contagious in regards to strep until 24 hours of antibiotics.  In regards to COVID follow current CDC guidelines regarding  quarantine.  Rapid Covid testing in clinic was positive. At this point the patient appears to be hemodynamically stable without evidence of hypoxia or endorgan injury. I discussed with her the possibility of decompensation and to monitor symptoms closely. Consider pulse oximetry and ER presentation if oximetry dips below 90%.  We discussed self isolation and ER precautions.  Patient should to proceed to ED for development of symptoms including but not limited to shortness of breath breath, respiratory distress, or worsening symptoms not manageable at home.  I instructed the patient to try to follow up with their PCP (if applicable) for follow up care.  If requested, I provided the patient with a work note to provide to their employer or school regarding returning to work and discontinuation of self isolation.  Symptomatic and supportive care:   Plenty of oral hydration and rest   Over the counter cough suppressant as directed.  Tylenol or ibuprofen for pain and fever as directed.   Warm salt water gargles for sore throat, soft foods, cool liquids.   Saline nasal spray and Flonase as a decongestant.   Infection control measures at home. Stay away from people, Hand washing, covering sneeze/cough, disinfect surfaces.   Remain home from work, school, and other populated environments.  Follow CDC guidelines regarding quarantine.  All questions were answered and patient demonstrated verbal understanding of above.     I personally reviewed prior external notes and test results pertinent to today's visit. Supportive care, natural history, differential diagnoses, and indications for immediate follow-up discussed. Return to clinic or go to ED if symptoms worsen or persist.  Red flag symptoms discussed.  Patient/Parent/Guardian voices understanding. Follow-up with your primary care provider in 3-5 days.  All side effects of medication discussed including allergic response, GI upset, tendon injury, rash, sedation etc    Please  note that this dictation was created using voice recognition software. I have made a reasonable attempt to correct obvious errors, but I expect that there are errors of grammar and possibly content that I did not discover before finalizing the note.    This note was electronically signed by Hattie Rodriguez PA-C

## 2024-08-03 ENCOUNTER — HOSPITAL ENCOUNTER (OUTPATIENT)
Dept: RADIOLOGY | Facility: MEDICAL CENTER | Age: 44
End: 2024-08-03
Attending: PHYSICIAN ASSISTANT
Payer: COMMERCIAL

## 2024-08-03 ENCOUNTER — OFFICE VISIT (OUTPATIENT)
Dept: URGENT CARE | Facility: PHYSICIAN GROUP | Age: 44
End: 2024-08-03
Payer: COMMERCIAL

## 2024-08-03 VITALS
HEART RATE: 78 BPM | BODY MASS INDEX: 28.16 KG/M2 | TEMPERATURE: 98.6 F | DIASTOLIC BLOOD PRESSURE: 70 MMHG | SYSTOLIC BLOOD PRESSURE: 110 MMHG | HEIGHT: 63 IN | RESPIRATION RATE: 20 BRPM | WEIGHT: 158.95 LBS | OXYGEN SATURATION: 99 %

## 2024-08-03 DIAGNOSIS — R31.21 ASYMPTOMATIC MICROSCOPIC HEMATURIA: ICD-10-CM

## 2024-08-03 DIAGNOSIS — M62.830 SPASM OF THORACIC BACK MUSCLE: ICD-10-CM

## 2024-08-03 LAB
APPEARANCE UR: CLEAR
BILIRUB UR STRIP-MCNC: NEGATIVE MG/DL
COLOR UR AUTO: YELLOW
GLUCOSE UR STRIP.AUTO-MCNC: 100 MG/DL
KETONES UR STRIP.AUTO-MCNC: NEGATIVE MG/DL
LEUKOCYTE ESTERASE UR QL STRIP.AUTO: NEGATIVE
NITRITE UR QL STRIP.AUTO: NEGATIVE
PH UR STRIP.AUTO: 5.5 [PH] (ref 5–8)
PROT UR QL STRIP: NEGATIVE MG/DL
RBC UR QL AUTO: NORMAL
SP GR UR STRIP.AUTO: 1.03
UROBILINOGEN UR STRIP-MCNC: 0.2 MG/DL

## 2024-08-03 PROCEDURE — 72070 X-RAY EXAM THORAC SPINE 2VWS: CPT

## 2024-08-03 PROCEDURE — 81002 URINALYSIS NONAUTO W/O SCOPE: CPT | Performed by: PHYSICIAN ASSISTANT

## 2024-08-03 PROCEDURE — 3074F SYST BP LT 130 MM HG: CPT | Performed by: PHYSICIAN ASSISTANT

## 2024-08-03 PROCEDURE — 99214 OFFICE O/P EST MOD 30 MIN: CPT | Mod: 25 | Performed by: PHYSICIAN ASSISTANT

## 2024-08-03 PROCEDURE — 74176 CT ABD & PELVIS W/O CONTRAST: CPT

## 2024-08-03 PROCEDURE — 3078F DIAST BP <80 MM HG: CPT | Performed by: PHYSICIAN ASSISTANT

## 2024-08-03 RX ORDER — KETOROLAC TROMETHAMINE 30 MG/ML
15 INJECTION, SOLUTION INTRAMUSCULAR; INTRAVENOUS ONCE
Status: COMPLETED | OUTPATIENT
Start: 2024-08-03 | End: 2024-08-03

## 2024-08-03 RX ORDER — METHYLPREDNISOLONE 4 MG
TABLET, DOSE PACK ORAL
Qty: 21 TABLET | Refills: 0 | Status: CANCELLED | OUTPATIENT
Start: 2024-08-03

## 2024-08-03 RX ORDER — CYCLOBENZAPRINE HCL 10 MG
10 TABLET ORAL EVERY 8 HOURS PRN
Qty: 30 TABLET | Refills: 0 | Status: CANCELLED | OUTPATIENT
Start: 2024-08-03

## 2024-08-03 RX ORDER — KETOROLAC TROMETHAMINE 15 MG/ML
15 INJECTION, SOLUTION INTRAMUSCULAR; INTRAVENOUS ONCE
Status: DISCONTINUED | OUTPATIENT
Start: 2024-08-03 | End: 2024-08-03

## 2024-08-03 RX ADMIN — KETOROLAC TROMETHAMINE 15 MG: 30 INJECTION, SOLUTION INTRAMUSCULAR; INTRAVENOUS at 10:22

## 2024-08-03 ASSESSMENT — ENCOUNTER SYMPTOMS
FOCAL WEAKNESS: 0
POLYDIPSIA: 0
BACK PAIN: 1
TINGLING: 0
COUGH: 0
SENSORY CHANGE: 0
VOMITING: 0
NAUSEA: 0
SHORTNESS OF BREATH: 0
FEVER: 0
CHILLS: 0

## 2024-08-03 NOTE — PROGRESS NOTES
"Subjective:   Issa Mario  is a 44 y.o. male who presents for Back Pain (X 4 weeks. Tender pain on (L) side of lower back. Putting pressure and taking deep breaths leads to pain. Does not know JUWAN.)      Back Pain  This is a recurrent problem. The current episode started more than 1 month ago. Pertinent negatives include no dysuria, fever or tingling.   Patient presents urgent care describing last 3 to 4 weeks of back pain on left mid back.  He describes a spasm type pain.  He denies trauma or injury.  He has tried some topical IcyHot.  He denies trying ibuprofen or Tylenol.  Denies history of back surgery or injections.  Denies numbness tingling or weakness.  Denies saddle anesthesia or incontinence.  He expresses some concern that this may be related to his breathing because it takes his breath away when spasm occurs.  He also expresses some concern for possible kidney dysfunction because he notes worse pain after drinking soda.  He denies polyuria or increased thirst.  Patient denies abdominal pain.  Patient denies a history of renal lithiasis.  He is found to have moderate hematuria on urinalysis.    Review of Systems   Constitutional:  Negative for chills and fever.   Respiratory:  Negative for cough and shortness of breath.    Gastrointestinal:  Negative for nausea and vomiting.   Genitourinary:  Negative for dysuria, frequency and hematuria.   Musculoskeletal:  Positive for back pain.   Neurological:  Negative for tingling, sensory change and focal weakness.   Endo/Heme/Allergies:  Negative for polydipsia.       No Known Allergies     Objective:   /70 (BP Location: Right arm, Patient Position: Sitting, BP Cuff Size: Adult long)   Pulse 78   Temp 37 °C (98.6 °F) (Temporal)   Resp 20   Ht 1.6 m (5' 3\")   Wt 72.1 kg (158 lb 15.2 oz)   SpO2 99%   BMI 28.16 kg/m²     Physical Exam  Vitals and nursing note reviewed.   Constitutional:       General: He is not in acute distress.     " Appearance: Normal appearance. He is well-developed. He is not diaphoretic.   HENT:      Head: Normocephalic and atraumatic.      Right Ear: External ear normal.      Left Ear: External ear normal.      Nose: Nose normal.   Eyes:      General: No scleral icterus.        Right eye: No discharge.         Left eye: No discharge.      Conjunctiva/sclera: Conjunctivae normal.   Pulmonary:      Effort: Pulmonary effort is normal. No respiratory distress.   Musculoskeletal:         General: Normal range of motion.      Cervical back: Neck supple.      Thoracic back: Spasms and tenderness present. No swelling, edema, deformity, signs of trauma, lacerations or bony tenderness. Normal range of motion. No scoliosis.        Back:       Comments: Reproducible area of palpable spasm and sharp pain with palpation over paraspinal musculature, no midline tenderness to palpation or deformity, no CVAT B   Skin:     General: Skin is warm and dry.      Coloration: Skin is not pale.   Neurological:      Mental Status: He is alert and oriented to person, place, and time.      Coordination: Coordination normal.       DX thoracic -   FINDINGS:  The thoracic vertebral bodies are normal in height and alignment.     There are no fractures.     There are no degenerative changes.     The visualized portions of the lungs are clear.     IMPRESSION:     Negative thoracic spine series           Exam Ended: 08/03/24  9:50 AM Last Resulted: 08/03/24 10:04 AM          POCT UA -   Results for orders placed or performed in visit on 08/03/24   POCT Urinalysis   Result Value Ref Range    POC Color Yellow Negative    POC Appearance Clear Negative    POC Glucose 100 Negative mg/dL    POC Bilirubin Negative Negative mg/dL    POC Ketones Negative Negative mg/dL    POC Specific Gravity 1.030 <1.005 - >1.030    POC Blood Moderate Negative    POC Urine PH 5.5 5.0 - 8.0    POC Protein Negative Negative mg/dL    POC Urobiligen 0.2 Negative (0.2) mg/dL    POC  Nitrites Negative Negative    POC Leukocyte Esterase Negative Negative         Assessment/Plan:   1. Spasm of thoracic back muscle  - POCT Urinalysis  - DX-THORACIC SPINE-2 VIEWS; Future  - CT-RENAL COLIC EVALUATION(A/P W/O); Future  - ketorolac (Toradol) injection 15 mg    2. Asymptomatic microscopic hematuria  - Referral to establish with PCP  Recommend conservative care, rest, ice, heat, work on gentle ROM exercises, no concerns based on radiographs and CT imaging.  With asymptomatic microscopic hematuria referrals placed for follow-up with primary care.  Return to clinic with lack of resolution or progression of symptoms.  ER precautions with any worsening symptoms are reviewed with patient/caregiver and they do express understanding    No answer from patient's phone and no identifiers on voicemail.    I have worn an N95 mask, gloves and eye protection for the entire encounter with this patient.     Differential diagnosis, natural history, supportive care, and indications for immediate follow-up discussed.    My total time spent caring for the patient on the day of the encounter was 31 minutes.   This does not include time spent on separately billable procedures/tests.

## 2025-02-04 ENCOUNTER — TELEPHONE (OUTPATIENT)
Dept: HEALTH INFORMATION MANAGEMENT | Facility: OTHER | Age: 45
End: 2025-02-04
Payer: COMMERCIAL